# Patient Record
Sex: MALE | Race: OTHER | NOT HISPANIC OR LATINO | URBAN - METROPOLITAN AREA
[De-identification: names, ages, dates, MRNs, and addresses within clinical notes are randomized per-mention and may not be internally consistent; named-entity substitution may affect disease eponyms.]

---

## 2019-09-19 VITALS — HEIGHT: 66 IN | WEIGHT: 230.38 LBS

## 2019-09-19 NOTE — H&P ADULT - HISTORY OF PRESENT ILLNESS
History obtained mostly from paperwork, poor historian.        59 y/o male, poor historian, PMHx  abnormal EKG w/RBBB, prolonged QTc, VSD s/p VSD surgery @ [facility] 2001, recently diagnosed Anemia/Rectal bleed (H/H 10.2/33 on recent labs), PMHx HTN, DM,  Syncope/dizziness, known CAD, s/p Cardiac Cath @ [facility] 2016: PCI/Stents x 3 presented to his cardiologist, Dr. Webster's office with c/o worsening, moderate to severe SOB upon ambulation of less than 1/2 city block, unable to climb a flight of stairs, has to stop for 4-5mins before getting relief of symptoms.  Pt also reports intermittent, 7-8/10, non-radiating, dull, pressure-like, exertional left sided CP, a/w sweating, lasting for several minutes that resolved often on its own.  Pt reports most recent episode of CP 2 days ago, lasting approximately 10-15 minutes, took home meds with relief in symptoms.  He denies palpitations, diaphoresis, orthopnea, PND, lightheadedness, N/V, LE swelling, history of CHF or CVA. No diagnostic testing done recently in lieu of cardiac catheterization.     In light of the PMHx, risk factors, current CCS Class III Anginal symptoms, pt is recommended for cardiac catheterization with possible intervention to evaluate for further CAD.

## 2019-09-19 NOTE — H&P ADULT - NSHPREVIEWOFSYSTEMS_GEN_ALL_CORE
57 y/o female with PMHx HTN, DM, Chronic Anemia, Rectal bleed, Abn EKG with RBBB, known CAD, s/p Cardiac Cath @ [facility] 2016:  PCI/Stents x 3. 57 y/o male. with history of abnormal EKG with RBBB, recently diagnosed Anemia/Rectal bleed (H/H 10.2/33 on recent labs), PMHx HTN, DM, known CAD, s/p Cardiac Cath @ [facility] 2016:  PCI/Stents x 3 presented to his cardiologist, Dr. Webster's office with c/o worsening SOB upon ambulation of less than 1/2 city block, unable to climb a flight of stairs, stopping for 4-5mins, with symptoms relieved with rest. Pt also reports intermittent, 7-8/10, non-radiating, exertional left sided CP, lasting for several minutes that resolved often on its own. History obtained mostly from paperwork, poor historian.        57 y/o male, poor historian, PMHx  abnormal EKG w/RBBB, prolonged QTc, Tetralogy of Fallot, VSD s/p VSD surgery @ [facility] 2001, recently diagnosed Anemia/Rectal bleed (H/H 10.2/33 on recent labs), PMHx HTN, DM,  Syncope/dizziness, known CAD, s/p Cardiac Cath @ [facility] 2016: PCI/Stents x 3 presented to his cardiologist, Dr. Webster's office with c/o worsening, moderate to severe SOB upon ambulation of less than 1/2 city block, unable to climb a flight of stairs, has to stop for 4-5mins before getting relief of symptoms.  Pt also reports intermittent, 7-8/10, non-radiating, dull, pressure-like, exertional left sided CP, a/w sweating, lasting for several minutes that resolved often on its own.  Pt reports most recent episode of CP was 2 days ago, lasting approximately 10-15 minutes, took home meds with relief in symptoms.  He denies palpitations, diaphoresis, orthopnea, PND, syncope, dizziness, lightheadedness, N/V, LE swelling, history of CHF or CVA. No diagnostic testing done recently in lieu of cardiac catheterization.     In light of the PMHx, risk factors, current CCS Class III Anginal symptoms, pt is recommended for cardiac catheterization with possible intervention to evaluate for further CAD. History obtained mostly from paperwork, poor historian.        59 y/o male, poor historian, PMHx  abnormal EKG w/RBBB, prolonged QTc, Tetralogy of Fallot, VSD s/p VSD surgery @ [facility] 2001, recently diagnosed Anemia/Rectal bleed (H/H 10.2/33 on recent labs), PMHx HTN, DM,  Syncope/dizziness, known CAD, s/p Cardiac Cath @ [facility] 2016: PCI/Stents x 3 presented to his cardiologist, Dr. Webster's office with c/o worsening, moderate to severe SOB upon ambulation of less than 1/2 city block, unable to climb a flight of stairs, has to stop for 4-5mins before getting relief of symptoms.  Pt also reports intermittent, 7-8/10, non-radiating, dull, pressure-like, exertional left sided CP, a/w sweating, lasting for several minutes that resolved often on its own.  Pt reports most recent episode of CP 2 days ago, lasting approximately 10-15 minutes, took home meds with relief in symptoms.  He denies palpitations, diaphoresis, orthopnea, PND, lightheadedness, N/V, LE swelling, history of CHF or CVA. No diagnostic testing done recently in lieu of cardiac catheterization.     In light of the PMHx, risk factors, current CCS Class III Anginal symptoms, pt is recommended for cardiac catheterization with possible intervention to evaluate for further CAD.

## 2019-09-19 NOTE — H&P ADULT - NSICDXPASTMEDICALHX_GEN_ALL_CORE_FT
PAST MEDICAL HISTORY:  Anemia     CAD, multiple vessel     DM (diabetes mellitus)     HTN (hypertension)     Tetralogy of Fallot     VSD (ventricular septal defect) PAST MEDICAL HISTORY:  Anemia     CAD, multiple vessel     DM (diabetes mellitus)     HTN (hypertension)     VSD (ventricular septal defect)

## 2019-09-19 NOTE — H&P ADULT - ASSESSMENT
57 y/o male, poor historian, PMHx  abnormal EKG w/RBBB, prolonged QTc, Tetralogy of Fallot, VSD s/p VSD surgery @ [facility] 2001, recently diagnosed Anemia/Rectal bleed (H/H 10.2/33 on recent labs), PMHx HTN, DM,  Syncope/dizziness, known CAD, s/p Cardiac Cath @ [facility] 2016: PCI/Stents x 3 presented to [facility] for recommended cardiac cath with possible intervention in light of pt's risk factors, CCS 3 anginal symptoms and prior history of CAD.       ASA III and Mallampati III    OF NOTE:     Risks & benefits of procedure and alternative therapy have been explained to the patient including but not limited to: allergic reaction, bleeding w/possible need for blood transfusion, infection, renal and vascular compromise, limb damage, arrhythmia, stroke, vessel dissection/perforation, Myocardial infarction, emergent CABG. Informed consent obtained and in chart. 59 y/o male, poor historian, PMHx  abnormal EKG w/RBBB, prolonged QTc, Tetralogy of Fallot, VSD s/p VSD surgery @ [facility] 2001, recently diagnosed Anemia/Rectal bleed (H/H 10.2/33 on recent labs), PMHx HTN, DM,  Syncope/dizziness, known CAD, s/p Cardiac Cath @ [facility] 2016: PCI/Stents x 3 presented to [facility] for recommended cardiac cath with possible intervention in light of pt's risk factors, CCS 3 anginal symptoms and prior history of CAD.       ASA III and Mallampati III    OF NOTE:  Pt's Hgb is 9.6 (10.2 at baseline, no recent bleeding noted), Dr. Mckenzie was notified and will proceed with cath, pt will be loaded with his maintenance dose of ASA 81 mg PO x1 and Plavix 75 mg PO x 1. No Guaiac necessary as per Dr. Mckenzie.      Risks & benefits of procedure and alternative therapy have been explained to the patient including but not limited to: allergic reaction, bleeding w/possible need for blood transfusion, infection, renal and vascular compromise, limb damage, arrhythmia, stroke, vessel dissection/perforation, Myocardial infarction, emergent CABG. Informed consent obtained and in chart.

## 2019-09-20 ENCOUNTER — OUTPATIENT (OUTPATIENT)
Dept: OUTPATIENT SERVICES | Facility: HOSPITAL | Age: 59
LOS: 1 days | Discharge: NURSING, REHAB OR LONG-TERM CARE | End: 2019-09-20
Payer: COMMERCIAL

## 2019-09-20 DIAGNOSIS — Z87.74 PERSONAL HISTORY OF (CORRECTED) CONGENITAL MALFORMATIONS OF HEART AND CIRCULATORY SYSTEM: Chronic | ICD-10-CM

## 2019-09-20 LAB
ALBUMIN SERPL ELPH-MCNC: 4 G/DL — SIGNIFICANT CHANGE UP (ref 3.3–5)
ALP SERPL-CCNC: 66 U/L — SIGNIFICANT CHANGE UP (ref 40–120)
ALT FLD-CCNC: 12 U/L — SIGNIFICANT CHANGE UP (ref 10–45)
ANION GAP SERPL CALC-SCNC: 10 MMOL/L — SIGNIFICANT CHANGE UP (ref 5–17)
APTT BLD: 27.3 SEC — LOW (ref 27.5–36.3)
AST SERPL-CCNC: 16 U/L — SIGNIFICANT CHANGE UP (ref 10–40)
BASOPHILS # BLD AUTO: 0.05 K/UL — SIGNIFICANT CHANGE UP (ref 0–0.2)
BASOPHILS NFR BLD AUTO: 0.7 % — SIGNIFICANT CHANGE UP (ref 0–2)
BILIRUB SERPL-MCNC: 0.3 MG/DL — SIGNIFICANT CHANGE UP (ref 0.2–1.2)
BUN SERPL-MCNC: 17 MG/DL — SIGNIFICANT CHANGE UP (ref 7–23)
CALCIUM SERPL-MCNC: 8.8 MG/DL — SIGNIFICANT CHANGE UP (ref 8.4–10.5)
CHLORIDE SERPL-SCNC: 102 MMOL/L — SIGNIFICANT CHANGE UP (ref 96–108)
CHOLEST SERPL-MCNC: 79 MG/DL — SIGNIFICANT CHANGE UP (ref 10–199)
CK MB CFR SERPL CALC: 1.9 NG/ML — SIGNIFICANT CHANGE UP (ref 0–6.7)
CK SERPL-CCNC: 131 U/L — SIGNIFICANT CHANGE UP (ref 30–200)
CO2 SERPL-SCNC: 24 MMOL/L — SIGNIFICANT CHANGE UP (ref 22–31)
CREAT SERPL-MCNC: 1.13 MG/DL — SIGNIFICANT CHANGE UP (ref 0.5–1.3)
EOSINOPHIL # BLD AUTO: 0.1 K/UL — SIGNIFICANT CHANGE UP (ref 0–0.5)
EOSINOPHIL NFR BLD AUTO: 1.4 % — SIGNIFICANT CHANGE UP (ref 0–6)
GLUCOSE BLDC GLUCOMTR-MCNC: 120 MG/DL — HIGH (ref 70–99)
GLUCOSE BLDC GLUCOMTR-MCNC: 165 MG/DL — HIGH (ref 70–99)
GLUCOSE SERPL-MCNC: 175 MG/DL — HIGH (ref 70–99)
HBA1C BLD-MCNC: 7.9 % — HIGH (ref 4–5.6)
HCT VFR BLD CALC: 33.3 % — LOW (ref 39–50)
HDLC SERPL-MCNC: 20 MG/DL — LOW
HGB BLD-MCNC: 9.6 G/DL — LOW (ref 13–17)
IMM GRANULOCYTES NFR BLD AUTO: 0.4 % — SIGNIFICANT CHANGE UP (ref 0–1.5)
INR BLD: 1.16 — SIGNIFICANT CHANGE UP (ref 0.88–1.16)
LIPID PNL WITH DIRECT LDL SERPL: 34 MG/DL — SIGNIFICANT CHANGE UP
LYMPHOCYTES # BLD AUTO: 1.87 K/UL — SIGNIFICANT CHANGE UP (ref 1–3.3)
LYMPHOCYTES # BLD AUTO: 27 % — SIGNIFICANT CHANGE UP (ref 13–44)
MCHC RBC-ENTMCNC: 21.5 PG — LOW (ref 27–34)
MCHC RBC-ENTMCNC: 28.8 GM/DL — LOW (ref 32–36)
MCV RBC AUTO: 74.7 FL — LOW (ref 80–100)
MONOCYTES # BLD AUTO: 0.69 K/UL — SIGNIFICANT CHANGE UP (ref 0–0.9)
MONOCYTES NFR BLD AUTO: 10 % — SIGNIFICANT CHANGE UP (ref 2–14)
NEUTROPHILS # BLD AUTO: 4.19 K/UL — SIGNIFICANT CHANGE UP (ref 1.8–7.4)
NEUTROPHILS NFR BLD AUTO: 60.5 % — SIGNIFICANT CHANGE UP (ref 43–77)
NRBC # BLD: 0 /100 WBCS — SIGNIFICANT CHANGE UP (ref 0–0)
PLATELET # BLD AUTO: 310 K/UL — SIGNIFICANT CHANGE UP (ref 150–400)
POTASSIUM SERPL-MCNC: 4.4 MMOL/L — SIGNIFICANT CHANGE UP (ref 3.5–5.3)
POTASSIUM SERPL-SCNC: 4.4 MMOL/L — SIGNIFICANT CHANGE UP (ref 3.5–5.3)
PROT SERPL-MCNC: 7.9 G/DL — SIGNIFICANT CHANGE UP (ref 6–8.3)
PROTHROM AB SERPL-ACNC: 13.2 SEC — HIGH (ref 10–12.9)
RBC # BLD: 4.46 M/UL — SIGNIFICANT CHANGE UP (ref 4.2–5.8)
RBC # FLD: 16.4 % — HIGH (ref 10.3–14.5)
SODIUM SERPL-SCNC: 136 MMOL/L — SIGNIFICANT CHANGE UP (ref 135–145)
TOTAL CHOLESTEROL/HDL RATIO MEASUREMENT: 4 RATIO — SIGNIFICANT CHANGE UP (ref 3.4–9.6)
TRIGL SERPL-MCNC: 125 MG/DL — SIGNIFICANT CHANGE UP (ref 10–149)
WBC # BLD: 6.93 K/UL — SIGNIFICANT CHANGE UP (ref 3.8–10.5)
WBC # FLD AUTO: 6.93 K/UL — SIGNIFICANT CHANGE UP (ref 3.8–10.5)

## 2019-09-20 PROCEDURE — 93458 L HRT ARTERY/VENTRICLE ANGIO: CPT | Mod: 26

## 2019-09-20 PROCEDURE — 80053 COMPREHEN METABOLIC PANEL: CPT

## 2019-09-20 PROCEDURE — 82550 ASSAY OF CK (CPK): CPT

## 2019-09-20 PROCEDURE — C1887: CPT

## 2019-09-20 PROCEDURE — 93458 L HRT ARTERY/VENTRICLE ANGIO: CPT

## 2019-09-20 PROCEDURE — 83036 HEMOGLOBIN GLYCOSYLATED A1C: CPT

## 2019-09-20 PROCEDURE — 93005 ELECTROCARDIOGRAM TRACING: CPT

## 2019-09-20 PROCEDURE — 82553 CREATINE MB FRACTION: CPT

## 2019-09-20 PROCEDURE — 85610 PROTHROMBIN TIME: CPT

## 2019-09-20 PROCEDURE — 93010 ELECTROCARDIOGRAM REPORT: CPT

## 2019-09-20 PROCEDURE — C1894: CPT

## 2019-09-20 PROCEDURE — 82962 GLUCOSE BLOOD TEST: CPT

## 2019-09-20 PROCEDURE — 85025 COMPLETE CBC W/AUTO DIFF WBC: CPT

## 2019-09-20 PROCEDURE — 80061 LIPID PANEL: CPT

## 2019-09-20 PROCEDURE — 85730 THROMBOPLASTIN TIME PARTIAL: CPT

## 2019-09-20 PROCEDURE — C1760: CPT

## 2019-09-20 PROCEDURE — C1769: CPT

## 2019-09-20 RX ORDER — NITROGLYCERIN 6.5 MG
1 CAPSULE, EXTENDED RELEASE ORAL
Qty: 0 | Refills: 0 | DISCHARGE

## 2019-09-20 RX ORDER — ASPIRIN/CALCIUM CARB/MAGNESIUM 324 MG
81 TABLET ORAL ONCE
Refills: 0 | Status: COMPLETED | OUTPATIENT
Start: 2019-09-20 | End: 2019-09-20

## 2019-09-20 RX ORDER — GLUCAGON INJECTION, SOLUTION 0.5 MG/.1ML
1 INJECTION, SOLUTION SUBCUTANEOUS ONCE
Refills: 0 | Status: DISCONTINUED | OUTPATIENT
Start: 2019-09-20 | End: 2019-09-20

## 2019-09-20 RX ORDER — SODIUM CHLORIDE 9 MG/ML
500 INJECTION INTRAMUSCULAR; INTRAVENOUS; SUBCUTANEOUS
Refills: 0 | Status: DISCONTINUED | OUTPATIENT
Start: 2019-09-20 | End: 2019-09-20

## 2019-09-20 RX ORDER — SODIUM CHLORIDE 9 MG/ML
1000 INJECTION, SOLUTION INTRAVENOUS
Refills: 0 | Status: DISCONTINUED | OUTPATIENT
Start: 2019-09-20 | End: 2019-09-20

## 2019-09-20 RX ORDER — CLOPIDOGREL BISULFATE 75 MG/1
75 TABLET, FILM COATED ORAL ONCE
Refills: 0 | Status: COMPLETED | OUTPATIENT
Start: 2019-09-20 | End: 2019-09-20

## 2019-09-20 RX ORDER — DEXTROSE 50 % IN WATER 50 %
12.5 SYRINGE (ML) INTRAVENOUS ONCE
Refills: 0 | Status: DISCONTINUED | OUTPATIENT
Start: 2019-09-20 | End: 2019-09-20

## 2019-09-20 RX ORDER — DEXTROSE 50 % IN WATER 50 %
25 SYRINGE (ML) INTRAVENOUS ONCE
Refills: 0 | Status: DISCONTINUED | OUTPATIENT
Start: 2019-09-20 | End: 2019-09-20

## 2019-09-20 RX ORDER — AMLODIPINE BESYLATE 2.5 MG/1
1 TABLET ORAL
Qty: 0 | Refills: 0 | DISCHARGE

## 2019-09-20 RX ORDER — INSULIN LISPRO 100/ML
VIAL (ML) SUBCUTANEOUS ONCE
Refills: 0 | Status: DISCONTINUED | OUTPATIENT
Start: 2019-09-20 | End: 2019-09-20

## 2019-09-20 RX ORDER — DEXTROSE 50 % IN WATER 50 %
15 SYRINGE (ML) INTRAVENOUS ONCE
Refills: 0 | Status: DISCONTINUED | OUTPATIENT
Start: 2019-09-20 | End: 2019-09-20

## 2019-09-20 RX ORDER — CHLORHEXIDINE GLUCONATE 213 G/1000ML
1 SOLUTION TOPICAL ONCE
Refills: 0 | Status: DISCONTINUED | OUTPATIENT
Start: 2019-09-20 | End: 2019-09-20

## 2019-09-20 RX ADMIN — SODIUM CHLORIDE 50 MILLILITER(S): 9 INJECTION INTRAMUSCULAR; INTRAVENOUS; SUBCUTANEOUS at 10:34

## 2019-09-20 RX ADMIN — CLOPIDOGREL BISULFATE 75 MILLIGRAM(S): 75 TABLET, FILM COATED ORAL at 10:31

## 2019-09-20 RX ADMIN — Medication 81 MILLIGRAM(S): at 10:32

## 2019-09-20 NOTE — PROGRESS NOTE ADULT - SUBJECTIVE AND OBJECTIVE BOX
Interventional Cardiology [facility] Discharge Note    Patient without complaints. Ambulated and voided without difficulties    Afebrile, VSS    Ext: Right Groin: no hematoma, no bruit, dressing; C/D/I    Pulses: intact RAD/DP/PT to baseline     A/P:  57 y/o male, poor historian, PMHx  abnormal EKG w/RBBB, prolonged QTc, VSD s/p VSD surgery @ [facility] 2001, recently diagnosed Anemia/Rectal bleed (H/H 10.2/33 on recent labs), PMHx HTN, DM,  Syncope/dizziness, known CAD, s/p Cardiac Cath @ [facility] 2016: PCI/Stents x 3 presented to his cardiologist, Dr. Webster's office with c/o worsening, moderate to severe SOB upon ambulation of less than 1/2 city block, unable to climb a flight of stairs, has to stop for 4-5mins before getting relief of symptoms.  Pt also reports intermittent, 7-8/10, non-radiating, dull, pressure-like, exertional left sided CP, a/w sweating, lasting for several minutes that resolved often on its own.  Pt reports most recent episode of CP 2 days ago, lasting approximately 10-15 minutes, took home meds with relief in symptoms.  He denies palpitations, diaphoresis, orthopnea, PND, lightheadedness, N/V, LE swelling, history of CHF or CVA. No diagnostic testing done recently in lieu of cardiac catheterization. In light of the PMHx, risk factors, current CCS Class III Anginal symptoms, pt is recommended for cardiac catheterization with possible intervention to evaluate for further CAD.  Pt s/p cardiac catheterization revealing LM 80%, OM 1 50%, EF 55%, EDP 9. Plan for outpt discussion and consultation for CABG vs PCI of LM, as discussed with Dr. Pulliam.      1.	Stable for discharge as per attending Dr. Pulliam after bed rest, pt voids, groin stable and 30 minutes of ambulation.  2.	Follow-up with PMD/Cardiologist Eleanor in 1-2 weeks  3.	Discharged forms signed and copies in chart

## 2019-09-20 NOTE — PROGRESS NOTE ADULT - SUBJECTIVE AND OBJECTIVE BOX
Procedure: LHC, Angioseal  Indication: UA, CAD  Complication: none    Result:  1) Left Main & Single Vessel CAD (80% LM, 50% OM1)  2) Normal LV function, EF 55%, LVEDP 9    Plan: Outpatient discussion & consultation for CABG vs PCI LM. To f/u with Dr. Webster.

## 2019-10-14 VITALS
HEART RATE: 73 BPM | OXYGEN SATURATION: 100 % | HEIGHT: 66 IN | SYSTOLIC BLOOD PRESSURE: 134 MMHG | DIASTOLIC BLOOD PRESSURE: 85 MMHG | WEIGHT: 228.4 LBS | RESPIRATION RATE: 16 BRPM | TEMPERATURE: 98 F

## 2019-10-14 PROBLEM — D64.9 ANEMIA, UNSPECIFIED: Chronic | Status: ACTIVE | Noted: 2019-09-20

## 2019-10-14 PROBLEM — I25.10 ATHEROSCLEROTIC HEART DISEASE OF NATIVE CORONARY ARTERY WITHOUT ANGINA PECTORIS: Chronic | Status: ACTIVE | Noted: 2019-09-20

## 2019-10-14 PROBLEM — I10 ESSENTIAL (PRIMARY) HYPERTENSION: Chronic | Status: ACTIVE | Noted: 2019-09-20

## 2019-10-14 PROBLEM — E11.9 TYPE 2 DIABETES MELLITUS WITHOUT COMPLICATIONS: Chronic | Status: ACTIVE | Noted: 2019-09-20

## 2019-10-14 PROBLEM — Q21.0 VENTRICULAR SEPTAL DEFECT: Chronic | Status: ACTIVE | Noted: 2019-09-20

## 2019-10-14 NOTE — H&P ADULT - ASSESSMENT
59 y/o male, poor historian, PMHx of HTN, abnormal EKG w/RBBB, prolonged QTc, VSD s/p VSD surgery @ [facility] 2001,  CAD s/p cardiac catheterization @ [facility] on 9/20/19 revealing LMCA 80%, OM 1 50% (discharged with plan for outpt discussion and consultation for CABG vs PCI of LM with subsequent decision with Dr. Webster for staged PCI of LCMA), acute blood loss anemia 2/2 rectal bleed requiring blood transfusion s/p EGD/colonoscopy ~10/1/19 that was unremarkable (H/H 9.6/33.3 on recent labs 9/20/19) who presented today for staged PCI of LM.    ASA III and Mallampati III    OF NOTE: pt was loaded with  mg PO x1 and was given maintenance dose of Plavix 75 mg PO x1 prior to procedure. Pt refused the , speaks English and Turkish, pt understands today's procedure and repeated back my explanation of the procedure/risks/benefits.    Risks & benefits of procedure and alternative therapy have been explained to the patient including but not limited to: allergic reaction, bleeding w/possible need for blood transfusion, infection, renal and vascular compromise, limb damage, arrhythmia, stroke, vessel dissection/perforation, Myocardial infarction, emergent CABG. Informed consent obtained and in chart.

## 2019-10-14 NOTE — H&P ADULT - NSICDXPASTMEDICALHX_GEN_ALL_CORE_FT
PAST MEDICAL HISTORY:  Anemia     CAD, multiple vessel     DM (diabetes mellitus)     HTN (hypertension)     VSD (ventricular septal defect)

## 2019-10-14 NOTE — H&P ADULT - HISTORY OF PRESENT ILLNESS
**Verify meds    59 y/o male, poor historian, PMHx of abnormal EKG w/RBBB, prolonged QTc, HTN, VSD s/p VSD surgery @ [facility] 2001,  CAD s/p cardiac catheterization @ [facility] on 9/20/19 revealing LM 80%, OM 1 50% with plan for outpt discussion and consultation for CABG vs PCI of LM, as discussed with Dr. Pulliam.    recently diagnosed anemia/rectal bleed (H/H 9.6/33.3 on recent labs 9/20/19),     who initially presented to Cardiologist, Dr. Webster, with c/o worsening, moderate to severe SOB upon ambulation of less than 1/2 city block, unable to climb a flight of stairs, has to stop for 4-5mins before getting relief of symptoms.      Pt also reports intermittent, 7-8/10, non-radiating, dull, pressure-like, exertional left sided CP, a/w sweating, lasting for several minutes that resolved often on its own.  Pt reports most recent episode of CP 2 days ago, lasting approximately 10-15 minutes, took home meds with relief in symptoms.     He denies palpitations, diaphoresis, orthopnea, PND, lightheadedness, N/V, LE swelling, history of CHF or CVA.     In light of the PMHx, risk factors, current CCS Class III Anginal symptoms, pt is recommended for cardiac catheterization with possible intervention to evaluate for further CAD. **Verify meds    59 y/o male, poor historian, PMHx of HTN, abnormal EKG w/RBBB, prolonged QTc, VSD s/p VSD surgery @ [facility] 2001,  CAD s/p cardiac catheterization @ [facility] on 9/20/19 revealing LMCA 80%, OM 1 50% (discharged with plan for outpt discussion and consultation for CABG vs PCI of LM with subsequent decision with Dr. Webster for staged PCI of LCMA), acute blood loss anemia 2/2 rectal bleed requiring blood transfusion s/p EGD/colonoscopy ~10/1/19 that was unremarkable (H/H 9.6/33.3 on recent labs 9/20/19) who continues to c/o non-radiating, LSCP described as dull/pressure-like and of 8/10 intensity with associated moderate/severe SOB/fatigue/diaphoresis upon ambulation of less than 1/2 city block & <1 flight of stairs, resolving with 4-5 minutes of rest, over past 6  months. Furthermore, pt endorses intermittent episodes of PND/SOB that wakes him up from sleep occurring once every other night over past 6 months. Ifeanyi palpitations, orthopnea, lightheadedness, LE edema, syncope, N/V, abdominal pain. In light pt's risk factors, current CCS Class III/VI Anginal symptoms, known physiologically significant lesion, pt is referred for staged PCI of LMCA 80%. 59 y/o male, poor historian, PMHx of HTN, abnormal EKG w/RBBB, prolonged QTc, VSD s/p VSD surgery @ [facility] 2001,  CAD s/p cardiac catheterization @ [facility] on 9/20/19 revealing LMCA 80%, OM 1 50% (discharged with plan for outpt discussion and consultation for CABG vs PCI of LM with subsequent decision with Dr. Webster for staged PCI of LCMA), acute blood loss anemia 2/2 rectal bleed requiring blood transfusion s/p EGD/colonoscopy ~10/1/19 that was unremarkable (H/H 9.6/33.3 on recent labs 9/20/19) who continues to c/o non-radiating, LSCP described as dull/pressure-like and of 8/10 intensity with associated moderate/severe SOB/fatigue/diaphoresis upon ambulation of less than 1/2 city block & <1 flight of stairs, resolving with 4-5 minutes of rest, over past 6  months. Furthermore, pt endorses intermittent episodes of PND/SOB that wakes him up from sleep occurring once every other night over past 6 months. Ifeanyi palpitations, orthopnea, lightheadedness, LE edema, syncope, N/V, abdominal pain. In light pt's risk factors, current CCS Class III/VI Anginal symptoms, known physiologically significant lesion, pt is referred for staged PCI of LMCA 80%.     OF NOTE: By last cath pt's EF is 55 and EDP is 9

## 2019-10-15 ENCOUNTER — INPATIENT (INPATIENT)
Facility: HOSPITAL | Age: 59
LOS: 0 days | Discharge: HOME | DRG: 247 | End: 2019-10-16
Attending: INTERNAL MEDICINE | Admitting: INTERNAL MEDICINE
Payer: COMMERCIAL

## 2019-10-15 DIAGNOSIS — Z87.74 PERSONAL HISTORY OF (CORRECTED) CONGENITAL MALFORMATIONS OF HEART AND CIRCULATORY SYSTEM: Chronic | ICD-10-CM

## 2019-10-15 LAB
ALBUMIN SERPL ELPH-MCNC: 4.2 G/DL — SIGNIFICANT CHANGE UP (ref 3.3–5)
ALP SERPL-CCNC: 71 U/L — SIGNIFICANT CHANGE UP (ref 40–120)
ALT FLD-CCNC: 15 U/L — SIGNIFICANT CHANGE UP (ref 10–45)
ANION GAP SERPL CALC-SCNC: 8 MMOL/L — SIGNIFICANT CHANGE UP (ref 5–17)
APTT BLD: 30.8 SEC — SIGNIFICANT CHANGE UP (ref 27.5–36.3)
AST SERPL-CCNC: 13 U/L — SIGNIFICANT CHANGE UP (ref 10–40)
BASOPHILS # BLD AUTO: 0.04 K/UL — SIGNIFICANT CHANGE UP (ref 0–0.2)
BASOPHILS NFR BLD AUTO: 0.6 % — SIGNIFICANT CHANGE UP (ref 0–2)
BILIRUB SERPL-MCNC: 0.4 MG/DL — SIGNIFICANT CHANGE UP (ref 0.2–1.2)
BUN SERPL-MCNC: 15 MG/DL — SIGNIFICANT CHANGE UP (ref 7–23)
CALCIUM SERPL-MCNC: 9.2 MG/DL — SIGNIFICANT CHANGE UP (ref 8.4–10.5)
CHLORIDE SERPL-SCNC: 106 MMOL/L — SIGNIFICANT CHANGE UP (ref 96–108)
CHOLEST SERPL-MCNC: 81 MG/DL — SIGNIFICANT CHANGE UP (ref 10–199)
CK MB CFR SERPL CALC: 1.9 NG/ML — SIGNIFICANT CHANGE UP (ref 0–6.7)
CK SERPL-CCNC: 102 U/L — SIGNIFICANT CHANGE UP (ref 30–200)
CO2 SERPL-SCNC: 24 MMOL/L — SIGNIFICANT CHANGE UP (ref 22–31)
CREAT SERPL-MCNC: 1.09 MG/DL — SIGNIFICANT CHANGE UP (ref 0.5–1.3)
CRP SERPL-MCNC: 0.75 MG/DL — HIGH (ref 0–0.4)
EOSINOPHIL # BLD AUTO: 0.11 K/UL — SIGNIFICANT CHANGE UP (ref 0–0.5)
EOSINOPHIL NFR BLD AUTO: 1.7 % — SIGNIFICANT CHANGE UP (ref 0–6)
GLUCOSE SERPL-MCNC: 238 MG/DL — HIGH (ref 70–99)
HBA1C BLD-MCNC: 7.8 % — HIGH (ref 4–5.6)
HCT VFR BLD CALC: 36.9 % — LOW (ref 39–50)
HDLC SERPL-MCNC: 19 MG/DL — LOW
HGB BLD-MCNC: 10.5 G/DL — LOW (ref 13–17)
IMM GRANULOCYTES NFR BLD AUTO: 0.3 % — SIGNIFICANT CHANGE UP (ref 0–1.5)
INR BLD: 1.1 — SIGNIFICANT CHANGE UP (ref 0.88–1.16)
LIPID PNL WITH DIRECT LDL SERPL: 37 MG/DL — SIGNIFICANT CHANGE UP
LYMPHOCYTES # BLD AUTO: 1.57 K/UL — SIGNIFICANT CHANGE UP (ref 1–3.3)
LYMPHOCYTES # BLD AUTO: 24.4 % — SIGNIFICANT CHANGE UP (ref 13–44)
MCHC RBC-ENTMCNC: 21.6 PG — LOW (ref 27–34)
MCHC RBC-ENTMCNC: 28.5 GM/DL — LOW (ref 32–36)
MCV RBC AUTO: 75.8 FL — LOW (ref 80–100)
MONOCYTES # BLD AUTO: 0.66 K/UL — SIGNIFICANT CHANGE UP (ref 0–0.9)
MONOCYTES NFR BLD AUTO: 10.2 % — SIGNIFICANT CHANGE UP (ref 2–14)
NEUTROPHILS # BLD AUTO: 4.04 K/UL — SIGNIFICANT CHANGE UP (ref 1.8–7.4)
NEUTROPHILS NFR BLD AUTO: 62.8 % — SIGNIFICANT CHANGE UP (ref 43–77)
NRBC # BLD: 0 /100 WBCS — SIGNIFICANT CHANGE UP (ref 0–0)
PLATELET # BLD AUTO: 340 K/UL — SIGNIFICANT CHANGE UP (ref 150–400)
POTASSIUM SERPL-MCNC: 4.3 MMOL/L — SIGNIFICANT CHANGE UP (ref 3.5–5.3)
POTASSIUM SERPL-SCNC: 4.3 MMOL/L — SIGNIFICANT CHANGE UP (ref 3.5–5.3)
PROT SERPL-MCNC: 8.2 G/DL — SIGNIFICANT CHANGE UP (ref 6–8.3)
PROTHROM AB SERPL-ACNC: 12.5 SEC — SIGNIFICANT CHANGE UP (ref 10–12.9)
RBC # BLD: 4.87 M/UL — SIGNIFICANT CHANGE UP (ref 4.2–5.8)
RBC # FLD: 16.2 % — HIGH (ref 10.3–14.5)
SODIUM SERPL-SCNC: 138 MMOL/L — SIGNIFICANT CHANGE UP (ref 135–145)
TOTAL CHOLESTEROL/HDL RATIO MEASUREMENT: 4.3 RATIO — SIGNIFICANT CHANGE UP (ref 3.4–9.6)
TRIGL SERPL-MCNC: 126 MG/DL — SIGNIFICANT CHANGE UP (ref 10–149)
WBC # BLD: 6.44 K/UL — SIGNIFICANT CHANGE UP (ref 3.8–10.5)
WBC # FLD AUTO: 6.44 K/UL — SIGNIFICANT CHANGE UP (ref 3.8–10.5)

## 2019-10-15 PROCEDURE — 92978 ENDOLUMINL IVUS OCT C 1ST: CPT | Mod: 26,LD,59

## 2019-10-15 PROCEDURE — 93010 ELECTROCARDIOGRAM REPORT: CPT

## 2019-10-15 PROCEDURE — 99233 SBSQ HOSP IP/OBS HIGH 50: CPT

## 2019-10-15 PROCEDURE — 93454 CORONARY ARTERY ANGIO S&I: CPT | Mod: 26,59

## 2019-10-15 PROCEDURE — 92928 PRQ TCAT PLMT NTRAC ST 1 LES: CPT | Mod: LM,59

## 2019-10-15 RX ORDER — AMLODIPINE BESYLATE 2.5 MG/1
5 TABLET ORAL ONCE
Refills: 0 | Status: COMPLETED | OUTPATIENT
Start: 2019-10-15 | End: 2019-10-15

## 2019-10-15 RX ORDER — BISOPROLOL FUMARATE 10 MG/1
10 TABLET, FILM COATED ORAL DAILY
Refills: 0 | Status: DISCONTINUED | OUTPATIENT
Start: 2019-10-16 | End: 2019-10-16

## 2019-10-15 RX ORDER — DEXTROSE 50 % IN WATER 50 %
12.5 SYRINGE (ML) INTRAVENOUS ONCE
Refills: 0 | Status: DISCONTINUED | OUTPATIENT
Start: 2019-10-15 | End: 2019-10-16

## 2019-10-15 RX ORDER — DEXTROSE 50 % IN WATER 50 %
25 SYRINGE (ML) INTRAVENOUS ONCE
Refills: 0 | Status: DISCONTINUED | OUTPATIENT
Start: 2019-10-15 | End: 2019-10-15

## 2019-10-15 RX ORDER — CLOPIDOGREL BISULFATE 75 MG/1
75 TABLET, FILM COATED ORAL ONCE
Refills: 0 | Status: COMPLETED | OUTPATIENT
Start: 2019-10-15 | End: 2019-10-15

## 2019-10-15 RX ORDER — LOSARTAN POTASSIUM 100 MG/1
100 TABLET, FILM COATED ORAL DAILY
Refills: 0 | Status: DISCONTINUED | OUTPATIENT
Start: 2019-10-16 | End: 2019-10-16

## 2019-10-15 RX ORDER — SODIUM CHLORIDE 9 MG/ML
1000 INJECTION, SOLUTION INTRAVENOUS
Refills: 0 | Status: DISCONTINUED | OUTPATIENT
Start: 2019-10-15 | End: 2019-10-16

## 2019-10-15 RX ORDER — DEXTROSE 50 % IN WATER 50 %
15 SYRINGE (ML) INTRAVENOUS ONCE
Refills: 0 | Status: DISCONTINUED | OUTPATIENT
Start: 2019-10-15 | End: 2019-10-16

## 2019-10-15 RX ORDER — DEXTROSE 50 % IN WATER 50 %
15 SYRINGE (ML) INTRAVENOUS ONCE
Refills: 0 | Status: DISCONTINUED | OUTPATIENT
Start: 2019-10-15 | End: 2019-10-15

## 2019-10-15 RX ORDER — ATORVASTATIN CALCIUM 80 MG/1
20 TABLET, FILM COATED ORAL AT BEDTIME
Refills: 0 | Status: DISCONTINUED | OUTPATIENT
Start: 2019-10-15 | End: 2019-10-16

## 2019-10-15 RX ORDER — INSULIN LISPRO 100/ML
VIAL (ML) SUBCUTANEOUS
Refills: 0 | Status: DISCONTINUED | OUTPATIENT
Start: 2019-10-15 | End: 2019-10-16

## 2019-10-15 RX ORDER — PANTOPRAZOLE SODIUM 20 MG/1
40 TABLET, DELAYED RELEASE ORAL
Refills: 0 | Status: DISCONTINUED | OUTPATIENT
Start: 2019-10-15 | End: 2019-10-16

## 2019-10-15 RX ORDER — CHLORHEXIDINE GLUCONATE 213 G/1000ML
1 SOLUTION TOPICAL ONCE
Refills: 0 | Status: DISCONTINUED | OUTPATIENT
Start: 2019-10-15 | End: 2019-10-15

## 2019-10-15 RX ORDER — GLUCAGON INJECTION, SOLUTION 0.5 MG/.1ML
1 INJECTION, SOLUTION SUBCUTANEOUS ONCE
Refills: 0 | Status: DISCONTINUED | OUTPATIENT
Start: 2019-10-15 | End: 2019-10-16

## 2019-10-15 RX ORDER — ASPIRIN/CALCIUM CARB/MAGNESIUM 324 MG
325 TABLET ORAL ONCE
Refills: 0 | Status: COMPLETED | OUTPATIENT
Start: 2019-10-15 | End: 2019-10-15

## 2019-10-15 RX ORDER — INFLUENZA VIRUS VACCINE 15; 15; 15; 15 UG/.5ML; UG/.5ML; UG/.5ML; UG/.5ML
0.5 SUSPENSION INTRAMUSCULAR ONCE
Refills: 0 | Status: COMPLETED | OUTPATIENT
Start: 2019-10-15 | End: 2019-10-16

## 2019-10-15 RX ORDER — DEXTROSE 50 % IN WATER 50 %
25 SYRINGE (ML) INTRAVENOUS ONCE
Refills: 0 | Status: DISCONTINUED | OUTPATIENT
Start: 2019-10-15 | End: 2019-10-16

## 2019-10-15 RX ORDER — DEXTROSE 50 % IN WATER 50 %
12.5 SYRINGE (ML) INTRAVENOUS ONCE
Refills: 0 | Status: DISCONTINUED | OUTPATIENT
Start: 2019-10-15 | End: 2019-10-15

## 2019-10-15 RX ORDER — BISOPROLOL FUMARATE 10 MG/1
1 TABLET, FILM COATED ORAL
Qty: 0 | Refills: 0 | DISCHARGE

## 2019-10-15 RX ORDER — SODIUM CHLORIDE 9 MG/ML
500 INJECTION INTRAMUSCULAR; INTRAVENOUS; SUBCUTANEOUS
Refills: 0 | Status: DISCONTINUED | OUTPATIENT
Start: 2019-10-15 | End: 2019-10-16

## 2019-10-15 RX ORDER — SODIUM CHLORIDE 9 MG/ML
1000 INJECTION, SOLUTION INTRAVENOUS
Refills: 0 | Status: DISCONTINUED | OUTPATIENT
Start: 2019-10-15 | End: 2019-10-15

## 2019-10-15 RX ORDER — GLUCAGON INJECTION, SOLUTION 0.5 MG/.1ML
1 INJECTION, SOLUTION SUBCUTANEOUS ONCE
Refills: 0 | Status: DISCONTINUED | OUTPATIENT
Start: 2019-10-15 | End: 2019-10-15

## 2019-10-15 RX ORDER — INSULIN LISPRO 100/ML
VIAL (ML) SUBCUTANEOUS ONCE
Refills: 0 | Status: DISCONTINUED | OUTPATIENT
Start: 2019-10-15 | End: 2019-10-15

## 2019-10-15 RX ORDER — TICAGRELOR 90 MG/1
90 TABLET ORAL EVERY 12 HOURS
Refills: 0 | Status: DISCONTINUED | OUTPATIENT
Start: 2019-10-16 | End: 2019-10-16

## 2019-10-15 RX ORDER — ASPIRIN/CALCIUM CARB/MAGNESIUM 324 MG
81 TABLET ORAL DAILY
Refills: 0 | Status: DISCONTINUED | OUTPATIENT
Start: 2019-10-15 | End: 2019-10-16

## 2019-10-15 RX ORDER — SODIUM CHLORIDE 9 MG/ML
500 INJECTION INTRAMUSCULAR; INTRAVENOUS; SUBCUTANEOUS
Refills: 0 | Status: DISCONTINUED | OUTPATIENT
Start: 2019-10-15 | End: 2019-10-15

## 2019-10-15 RX ADMIN — ATORVASTATIN CALCIUM 20 MILLIGRAM(S): 80 TABLET, FILM COATED ORAL at 21:42

## 2019-10-15 RX ADMIN — CLOPIDOGREL BISULFATE 75 MILLIGRAM(S): 75 TABLET, FILM COATED ORAL at 12:23

## 2019-10-15 RX ADMIN — Medication 325 MILLIGRAM(S): at 12:24

## 2019-10-15 RX ADMIN — SODIUM CHLORIDE 75 MILLILITER(S): 9 INJECTION INTRAMUSCULAR; INTRAVENOUS; SUBCUTANEOUS at 12:17

## 2019-10-15 RX ADMIN — Medication 2: at 21:42

## 2019-10-15 RX ADMIN — AMLODIPINE BESYLATE 5 MILLIGRAM(S): 2.5 TABLET ORAL at 21:42

## 2019-10-15 NOTE — PROGRESS NOTE ADULT - SUBJECTIVE AND OBJECTIVE BOX
Procedure: Coronary angiography w/o LHC, IVUS LM, NITA LM, Angioseal  Indication: UA, CAD  Complication: none    Result:  1) Successful NITA of 80% LM with 4.5x15mm Resolute stent. Post-dilated with 5.0mm NC balloon.   2) IVUS: Pre-stent CSA=4.4mm2, post-stent CSA=13.3mm2    Plan: Admit given ACS presentation. Brilinta 180mg load followed by 90mg BID = ASA 81mg QD x 12 months. After 12 months can switch to Plavix + ASA indefinitely. To f/u with Dr. Webster.

## 2019-10-15 NOTE — CONSULT NOTE ADULT - SUBJECTIVE AND OBJECTIVE BOX
Preventive Cardiology Consultation Note    Cardiologist - Dr. Rahul Webster     CHIEF COMPLAINT: s/p cardiac catheterization requiring cardiovascular prevention optimization and education    HISTORY OF PRESENT ILLNESS: 58 y/o male, PMHx of HTN, abnormal EKG w/RBBB, prolonged QTc, VSD s/p VSD surgery @ [facility] 2001, CAD s/p cardiac catheterization @ [facility] on 9/20/19 revealing LMCA 80%, OM 1 50% (discharged with plan for outpt discussion and consultation for CABG vs PCI of LM with subsequent decision with Dr. Webster for staged PCI of LCMA), acute blood loss anemia 2/2 rectal bleed requiring blood transfusion s/p EGD/colonoscopy ~10/1/19 that was unremarkable. Patient is now s/p cardiac cath today 10/15/19: successful NITA of 80% LM with 4.5x15mm Resolute stent.    Review of systems otherwise negative.     Lifestyle History:  Mediterranean Diet Score (9 question survey) was 5.   (8-9: optimal, 6-7: near-optimal, 4-5: suboptimal, 0-3: markedly suboptimal)  Exercise: Patient denies any regular exercise other than walking necessary for daily activities.   Smoking: Patient denies any history of smoking.   Stress: Patient denies any stress.     PAST MEDICAL & SURGICAL HISTORY:  CAD, multiple vessel  DM (diabetes mellitus)  HTN (hypertension)  Anemia  VSD (ventricular septal defect)  S/P VSD repair: 2001    FAMILY HISTORY:   heart disease - both brothers.    Allergies:   No Known Allergies      HOME MEDICATIONS:   alogliptin-metformin 12.5 mg-1000 mg oral tablet: 1 tab(s) orally 2 times a day (15 Oct 2019 12:17)  aspirin 81 mg oral tablet: 1 tab(s) orally once a day (15 Oct 2019 12:17)  atorvastatin 20 mg oral tablet: 1 tab(s) orally once a day (15 Oct 2019 12:17)  bisoprolol 10 mg oral tablet: 1 tab(s) orally once a day (15 Oct 2019 12:17)  clopidogrel 75 mg oral tablet: 1 tab(s) orally once a day (15 Oct 2019 12:17)  Ferrex 150 Plus oral capsule: 1 cap(s) orally once a day (15 Oct 2019 12:17)  glipiZIDE 5 mg oral tablet: orally once a day (15 Oct 2019 12:17)  losartan 100 mg oral tablet: 1 tab(s) orally once a day (15 Oct 2019 12:17)  pantoprazole 40 mg oral delayed release tablet: 1 tab(s) orally once a day (15 Oct 2019 12:17)      PHYSICAL EXAM:    HR: 78 (10-15-19 @ 16:32) (75 - 78)  BP: 152/97 (10-15-19 @ 16:32) (152/97 - 180/107)  RR: 18 (10-15-19 @ 16:32) (16 - 18)  SpO2: 98% (10-15-19 @ 16:32) (98% - 98%)  Height (cm): 167.64 (10-15 @ 12:24)  Weight (kg): 103.6 (10-15 @ 12:24)  BMI (kg/m2): 36.9 (10-15 @ 12:24)  	  Gen- awake, conversive  Head-NCAT; eyes: no corneal arcus noted b/l; no xanthelasmas   Neck- no thyromegaly, no cervical LAD, no JVD, no carotid bruit b/l  Respiratory- good air entry b/l, no WRR  Cardiovascular- S1S2, RRR, no MRG appr, no LE edema b/l, distal pulses 2+ b/l  Gastrointestinal- no HSM, no masses  Neurology- moves all extremities, no focal deficits  Psych- normal affect, non-depressed mood  Skin- no lesions, no rashes, no xanthomas     LABS:	                          10.5   6.44  )-----------( 340      ( 15 Oct 2019 11:33 )             36.9     10-15    138  |  106  |  15  ----------------------------<  238<H>  4.3   |  24  |  1.09    Ca    9.2      15 Oct 2019 11:33    TPro  8.2  /  Alb  4.2  /  TBili  0.4  /  DBili  x   /  AST  13  /  ALT  15  /  AlkPhos  71  10-15      Hemoglobin A1C, Whole Blood: 7.8 % (10-15 @ 11:33)      Cholesterol, Serum: 81 mg/dL (10-15 @ 11:33)  HDL Cholesterol, Serum: 19 mg/dL (10-15 @ 11:33)  Triglycerides, Serum: 126 mg/dL (10-15 @ 11:33)  Direct LDL: 37 mg/dL (10-15 @ 11:33)    C-Reactive Protein, Serum: 0.75 mg/dL (10-15 @ 11:33)      ASSESSMENT/RECOMMENDATIONS: 	  Patient's dietary, exercise and overall lifestyle habits were reviewed. The concept of atherosclerosis and its systemic nature was discussed with a focus on the need to get all cardiovascular risk factors to goal. At this time, I would like to make the following recommendations to optimize atherosclerotic risk factors.     RECOMMENDATIONS:   Anti-platelet Therapy: DAPT per interventionalist recommendation.   Lipid Therapy: Patient is currently taking atorvastatin 20mg daily and is compliant and tolerating it well. We believe this is an appropriate medication at this time, as his current LDL-C is at goal and lifestyle modifications will likely benefit him further.   Hypertension: Blood pressures during this stay were well-controlled.   Mediterranean Diet Score is 5. Some suggestions include continue incorporating 2 or more servings per day of vegetables, fruits, and whole grains. Increase intake of fish and legumes/beans to 2 or more servings per week. Aim to increase intake of healthy fats, such as olive oil and avocados, and have a handful of nuts/seeds most days. Reduce red/processed meat consumption to 2 or fewer times per week.   Exercise: Recommended gradually increasing activity to 30-45 minutes most days of the week once cleared by referring cardiologist. Cardiac rehab might benefit this patient and is covered by major insurance plans (other than co-pays), please refer.   Medication Adherence: Patient has no issues with adherence at this time.   Smoking: This patient is a non-smoker.   Obesity/Overweight: The patient was advised about specific mechanisms such as reduced portions and increased activity for efforts toward weight loss.   Glucometabolic State: Patient's blood sugar is not well-controlled on the current regimen at this time. HbA1c today was 7.8%. Depending on discussions with patient's PCP and/or endocrinologist, we would recommend the possibility of switching from glipizide to a GLP-1 agonist or SGLT-2 inhibitor, as these newer agents have cardiovascular benefits as well as glucose control.   Sleep Apnea: The patient is at low risk for sleep apnea.   Psychological Stress: The patient appears to be coping with stressors well at this time.     Thank you for the opportunity to see this patient. Please feel free to contact Prevention if there are any questions, or if you feel that your patient would benefit from continued follow-up visits with the Program.    I am acting as a scribe on behalf of Dr. Marti Acosta,    Sheba Zazueta, [facility]  Cardiovascular Prevention     Marti Acosta MD  System Director, Cardiovascular Prevention

## 2019-10-16 VITALS
SYSTOLIC BLOOD PRESSURE: 144 MMHG | HEART RATE: 74 BPM | OXYGEN SATURATION: 98 % | DIASTOLIC BLOOD PRESSURE: 88 MMHG | RESPIRATION RATE: 16 BRPM

## 2019-10-16 LAB
ANION GAP SERPL CALC-SCNC: 11 MMOL/L — SIGNIFICANT CHANGE UP (ref 5–17)
BUN SERPL-MCNC: 12 MG/DL — SIGNIFICANT CHANGE UP (ref 7–23)
CALCIUM SERPL-MCNC: 9.2 MG/DL — SIGNIFICANT CHANGE UP (ref 8.4–10.5)
CHLORIDE SERPL-SCNC: 104 MMOL/L — SIGNIFICANT CHANGE UP (ref 96–108)
CO2 SERPL-SCNC: 26 MMOL/L — SIGNIFICANT CHANGE UP (ref 22–31)
CREAT SERPL-MCNC: 1.08 MG/DL — SIGNIFICANT CHANGE UP (ref 0.5–1.3)
GLUCOSE SERPL-MCNC: 174 MG/DL — HIGH (ref 70–99)
HCT VFR BLD CALC: 34.6 % — LOW (ref 39–50)
HGB BLD-MCNC: 10 G/DL — LOW (ref 13–17)
MAGNESIUM SERPL-MCNC: 1.8 MG/DL — SIGNIFICANT CHANGE UP (ref 1.6–2.6)
MCHC RBC-ENTMCNC: 21.6 PG — LOW (ref 27–34)
MCHC RBC-ENTMCNC: 28.9 GM/DL — LOW (ref 32–36)
MCV RBC AUTO: 74.7 FL — LOW (ref 80–100)
NRBC # BLD: 0 /100 WBCS — SIGNIFICANT CHANGE UP (ref 0–0)
PLATELET # BLD AUTO: 298 K/UL — SIGNIFICANT CHANGE UP (ref 150–400)
POTASSIUM SERPL-MCNC: 4 MMOL/L — SIGNIFICANT CHANGE UP (ref 3.5–5.3)
POTASSIUM SERPL-SCNC: 4 MMOL/L — SIGNIFICANT CHANGE UP (ref 3.5–5.3)
RBC # BLD: 4.63 M/UL — SIGNIFICANT CHANGE UP (ref 4.2–5.8)
RBC # FLD: 16.4 % — HIGH (ref 10.3–14.5)
SODIUM SERPL-SCNC: 141 MMOL/L — SIGNIFICANT CHANGE UP (ref 135–145)
WBC # BLD: 6.72 K/UL — SIGNIFICANT CHANGE UP (ref 3.8–10.5)
WBC # FLD AUTO: 6.72 K/UL — SIGNIFICANT CHANGE UP (ref 3.8–10.5)

## 2019-10-16 PROCEDURE — 93010 ELECTROCARDIOGRAM REPORT: CPT

## 2019-10-16 PROCEDURE — 99239 HOSP IP/OBS DSCHRG MGMT >30: CPT

## 2019-10-16 PROCEDURE — 99232 SBSQ HOSP IP/OBS MODERATE 35: CPT | Mod: 25

## 2019-10-16 RX ORDER — PRASUGREL 5 MG/1
1 TABLET, FILM COATED ORAL
Qty: 30 | Refills: 11
Start: 2019-10-16 | End: 2020-10-09

## 2019-10-16 RX ORDER — MAGNESIUM OXIDE 400 MG ORAL TABLET 241.3 MG
800 TABLET ORAL ONCE
Refills: 0 | Status: COMPLETED | OUTPATIENT
Start: 2019-10-16 | End: 2019-10-16

## 2019-10-16 RX ORDER — CLOPIDOGREL BISULFATE 75 MG/1
1 TABLET, FILM COATED ORAL
Qty: 0 | Refills: 0 | DISCHARGE

## 2019-10-16 RX ORDER — TICAGRELOR 90 MG/1
1 TABLET ORAL
Qty: 60 | Refills: 11
Start: 2019-10-16 | End: 2020-10-09

## 2019-10-16 RX ORDER — ATORVASTATIN CALCIUM 80 MG/1
1 TABLET, FILM COATED ORAL
Qty: 0 | Refills: 0 | DISCHARGE

## 2019-10-16 RX ORDER — ATORVASTATIN CALCIUM 80 MG/1
1 TABLET, FILM COATED ORAL
Qty: 30 | Refills: 3
Start: 2019-10-16 | End: 2020-02-12

## 2019-10-16 RX ORDER — PRASUGREL 5 MG/1
60 TABLET, FILM COATED ORAL ONCE
Refills: 0 | Status: COMPLETED | OUTPATIENT
Start: 2019-10-16 | End: 2019-10-16

## 2019-10-16 RX ADMIN — Medication 81 MILLIGRAM(S): at 11:43

## 2019-10-16 RX ADMIN — TICAGRELOR 90 MILLIGRAM(S): 90 TABLET ORAL at 06:25

## 2019-10-16 RX ADMIN — Medication 2: at 06:51

## 2019-10-16 RX ADMIN — BISOPROLOL FUMARATE 10 MILLIGRAM(S): 10 TABLET, FILM COATED ORAL at 06:51

## 2019-10-16 RX ADMIN — MAGNESIUM OXIDE 400 MG ORAL TABLET 800 MILLIGRAM(S): 241.3 TABLET ORAL at 09:54

## 2019-10-16 RX ADMIN — PRASUGREL 60 MILLIGRAM(S): 5 TABLET, FILM COATED ORAL at 12:21

## 2019-10-16 RX ADMIN — INFLUENZA VIRUS VACCINE 0.5 MILLILITER(S): 15; 15; 15; 15 SUSPENSION INTRAMUSCULAR at 08:42

## 2019-10-16 RX ADMIN — PANTOPRAZOLE SODIUM 40 MILLIGRAM(S): 20 TABLET, DELAYED RELEASE ORAL at 06:25

## 2019-10-16 RX ADMIN — Medication 6: at 11:43

## 2019-10-16 NOTE — DISCHARGE NOTE PROVIDER - NSDCACTIVITY_GEN_ALL_CORE
Showering allowed/No heavy lifting/straining Walking - Indoors allowed/Stairs allowed/No heavy lifting/straining/Walking - Outdoors allowed/Showering allowed

## 2019-10-16 NOTE — DISCHARGE NOTE PROVIDER - NSDCCPCAREPLAN_GEN_ALL_CORE_FT
PRINCIPAL DISCHARGE DIAGNOSIS  Diagnosis: Coronary artery disease  Assessment and Plan of Treatment: •	You underwent a cardiac catheterization 10/15/19 and the blockage in your Left main artery (artery in your heart) was opened with stent placement.  NEVER MISS A DOSE OF ASPIRIN OR PLAVIX;; IF YOU DO, YOU ARE AT RISK OF YOUR STENT CLOSING AND HAVING A HEART ATTACK. DO NOT STOP THESE TWO MEDICATIONS UNLESS INSTRUCTED TO DO SO BY YOUR CARDIOLOGIST  •	Your procedure was done through your groin.   •	You do not need to keep this area covered and you may shower  •	Please avoid any heavy lifting  (no more than 3 to 5 lbs) or strenuous activity for five days  •	If you develop any swelling, bleeding, hardening of the skin (hematoma formation), acute pain, numbness/tingling  in your  leg please contact your doctor immediately or call our 24/7 line: [phone].         SECONDARY DISCHARGE DIAGNOSES  Diagnosis: Hyperlipidemia  Assessment and Plan of Treatment: Please take    Diagnosis: HTN (hypertension)  Assessment and Plan of Treatment: Please take PRINCIPAL DISCHARGE DIAGNOSIS  Diagnosis: Coronary artery disease  Assessment and Plan of Treatment: •	You underwent a cardiac catheterization 10/15/19 and the blockage in your Left main artery (artery in your heart) was opened with stent placement.  Please stop taking PLAVIX 75 mg daily and START taking Brilinta 90 mg orally TWICE A DAY. In addition please make sure you continue to take your Aspirin 81 mg daily.   The Aspirin and Brilinta will keep the blockage open in your leg and the stent open in your heart! Do not stop these two medication unless you are instructed by your cardiologist .  NEVER MISS A DOSE OF ASPIRIN OR BRILLINTA;; IF YOU DO, YOU ARE AT RISK OF YOUR STENT CLOSING AND HAVING A HEART ATTACK. DO NOT STOP THESE TWO MEDICATIONS UNLESS INSTRUCTED TO DO SO BY YOUR CARDIOLOGIST  •	Your procedure was done through your groin.   •	You do not need to keep this area covered and you may shower  •	Please avoid any heavy lifting  (no more than 3 to 5 lbs) or strenuous activity for five days  •	If you develop any swelling, bleeding, hardening of the skin (hematoma formation), acute pain, numbness/tingling  in your  leg please contact your doctor immediately or call our 24/7 line: [phone].         SECONDARY DISCHARGE DIAGNOSES  Diagnosis: Hyperlipidemia  Assessment and Plan of Treatment: Please take    Diagnosis: HTN (hypertension)  Assessment and Plan of Treatment: Please take PRINCIPAL DISCHARGE DIAGNOSIS  Diagnosis: Coronary artery disease  Assessment and Plan of Treatment: •	You underwent a cardiac catheterization 10/15/19 and the blockage in your Left main artery (artery in your heart) was opened with stent placement.  Please STOP taking PLAVIX 75 mg daily and START taking Brilinta 90 mg orally TWICE A DAY. In addition please make sure you continue to take your Aspirin 81 mg daily.   The Aspirin and Brilinta will keep the blockage open in your leg and the stent open in your heart! Do not stop these two medication unless you are instructed by your cardiologist Dr Webster.  NEVER MISS A DOSE OF ASPIRIN OR BRILLINTA;; IF YOU DO, YOU ARE AT RISK OF YOUR STENT CLOSING AND HAVING A HEART ATTACK. DO NOT STOP THESE TWO MEDICATIONS UNLESS INSTRUCTED TO DO SO BY YOUR CARDIOLOGIST  •	Your procedure was done through your groin.   •	You do not need to keep this area covered and you may shower  •	Please avoid any heavy lifting  (no more than 3 to 5 lbs) or strenuous activity for five days  •	If you develop any swelling, bleeding, hardening of the skin (hematoma formation), acute pain, numbness/tingling  in your  leg please contact your doctor immediately or call our 24/7 line: [phone].         SECONDARY DISCHARGE DIAGNOSES  Diagnosis: Type 2 diabetes mellitus  Assessment and Plan of Treatment: Continue taking your home diabetes medication.  DO NOT RESTART YOUR ALOGLIPTIN-METFORMIN UNTIL FRIDAY 10/18/19.    Diagnosis: Hyperlipidemia  Assessment and Plan of Treatment: Your Atorvastatin was increased to 40mg once daily at bedtime.  A new prescription was sent to [facility] pharmacy.    Diagnosis: HTN (hypertension)  Assessment and Plan of Treatment: Please continue taking your Bisoprolol and Losartan home doses

## 2019-10-16 NOTE — DISCHARGE NOTE PROVIDER - NSDCFUADDINST_GEN_ALL_CORE_FT
•	Your procedure was done through your groin.   •	You do not need to keep this area covered and you may shower  •	Please avoid any heavy lifting  (no more than 3 to 5 lbs) or strenuous activity for five days  •	If you develop any swelling, bleeding, hardening of the skin (hematoma formation), acute pain, numbness/tingling  in your leg please contact your doctor immediately or call our 24/7 line: [phone].

## 2019-10-16 NOTE — DISCHARGE NOTE NURSING/CASE MANAGEMENT/SOCIAL WORK - PATIENT PORTAL LINK FT
You can access the FollowMyHealth Patient Portal offered by [facility] by registering at the following website: [email or web address]. By joining [facility]’s FollowMyHealth portal, you will also be able to view your health information using other applications (apps) compatible with our system.

## 2019-10-16 NOTE — DISCHARGE NOTE PROVIDER - CARE PROVIDER_API CALL
Rahul Webster (MD)  Cardiovascular Disease; Internal Medicine  [address] [place] [address]  Phone: [phone]  Fax: [phone]  Follow Up Time:

## 2019-10-16 NOTE — PROGRESS NOTE ADULT - SUBJECTIVE AND OBJECTIVE BOX
INTERVENTIONAL CARDIOLOGY FOLLOW UP NOTE    -Patient seen and examined this am  -No events overnight  -No complaints this am    T(C): 35.9 (10-16-19 @ 09:51), Max: 36.8 (10-15-19 @ 21:54)  HR: 75 (10-16-19 @ 08:35) (75 - 853)  BP: 159/87 (10-16-19 @ 08:35) (138/82 - 180/107)  RR: 17 (10-16-19 @ 08:35) (16 - 18)  SpO2: 97% (10-16-19 @ 08:35) (96% - 99%)    VASCULAR ACCESS EXAM:  FEMORAL:  2+ right/left femoral pulse  2+ DP/PT pulses.  -Access site clean, non-tender, without ecchymosis or hematoma.      A/P  S/p PCI via femoral approach with no evidence of vascular complications post procedure.  -continue with current medications including dual antiplatelet therapy  -discharge instructions as per protocol  -outpatient follow up with primary cardiologist

## 2019-10-16 NOTE — DISCHARGE NOTE PROVIDER - HOSPITAL COURSE
57 y/o male, poor historian, PMHx of HTN, abnormal EKG w/RBBB, prolonged QTc, VSD s/p VSD surgery @ [facility] 2001,  CAD s/p cardiac catheterization @ [facility] on 9/20/19 revealing LMCA 80%, OM 1 50% (discharged with plan for outpt discussion and consultation for CABG vs PCI of LM with subsequent decision with Dr. Webster for staged PCI of LCMA), acute blood loss anemia 2/2 rectal bleed requiring blood transfusion s/p EGD/colonoscopy ~10/1/19 that was unremarkable (H/H 9.6/33.3 on recent labs 9/20/19) who continues to c/o non-radiating, LSCP described as dull/pressure-like and of 8/10 intensity with associated moderate/severe SOB/fatigue/diaphoresis upon ambulation of less than 1/2 city block & <1 flight of stairs, resolving with 4-5 minutes of rest, over past 6  months. Furthermore, pt endorses intermittent episodes of PND/SOB that wakes him up from sleep occurring once every other night over past 6 months. Ifeanyi palpitations, orthopnea, lightheadedness, LE edema, syncope, N/V, abdominal pain. In light pt's risk factors, current CCS Class III/VI Anginal symptoms, known physiologically significant lesion, pt is referred for staged PCI of LMCA 80%.         Pt. s/p cardiac cath 10/16/19: IVUS/NITA LM; groin angioseal; EF 55 % by prior cath. Plavix d/c; pt. loaded with brillinta and pt. to c/w ASA and Brilinta 90 mg BID. As per ; pt. can switch to ASA/Plavix after 12 month indefinitely.    Pt. seen and examined at bedside today am. Pt. comfortable, denies any CP, SOB, dizziness, palpitations, groin access site stable, no bleeding and hematoma noted; distal pulse intact.   VSS. Labs stable o/n except for Mg … supplemented. Home meds reviewed with Dr. Carlson and pt. to be d/c on ASA/Brilinta,..         Pt. stable to be d/c as per Dr. Carlson and to f/u with Dr. Webster in 1-2 week     Patient has been given appropriate discharge instructions including medication regimen, access site management and follow up. Prescriptions have been e-prescribed to patient's preferred pharmacy 59 y/o male, poor historian, PMHx of HTN, abnormal EKG w/RBBB, prolonged QTc, VSD s/p VSD surgery @ [facility] 2001,  CAD s/p cardiac catheterization @ [facility] on 9/20/19 revealing LMCA 80%, OM 1 50% (discharged with plan for outpt discussion and consultation for CABG vs PCI of LM with subsequent decision with Dr. Webster for staged PCI of LCMA), acute blood loss anemia 2/2 rectal bleed requiring blood transfusion s/p EGD/colonoscopy ~10/1/19 that was unremarkable (H/H 9.6/33.3 on recent labs 9/20/19) who continues to c/o non-radiating, LSCP described as dull/pressure-like and of 8/10 intensity with associated moderate/severe SOB/fatigue/diaphoresis upon ambulation of less than 1/2 city block & <1 flight of stairs, resolving with 4-5 minutes of rest, over past 6  months. Furthermore, pt endorses intermittent episodes of PND/SOB that wakes him up from sleep occurring once every other night over past 6 months. Ifeanyi palpitations, orthopnea, lightheadedness, LE edema, syncope, N/V, abdominal pain. In light pt's risk factors, current CCS Class III/VI Anginal symptoms, known physiologically significant lesion, pt is referred for staged PCI of LMCA 80%.         Pt. s/p cardiac cath 10/16/19: IVUS/NITA LM; groin angioseal; EF 55 % by prior cath. Plavix d/c; pt. loaded with brillinta and pt. to c/w ASA and Brilinta 90 mg BID. As per ; pt. can switch to ASA/Plavix after 12 month indefinitely.    Pt. seen and examined at bedside today am. Pt. comfortable, denies any CP, SOB, dizziness, palpitations, groin access site stable, no bleeding and hematoma noted; distal pulse intact.   VSS. Labs stable o/n except for Mg … supplemented. Home meds reviewed with Dr. Carlson and pt. to be d/c on ASA/Brilinta and Atorvastatin will be increased to 40mg qHS.          Pt. stable to be d/c as per Dr. Carlson and to f/u with Dr. Webster in 1-2 week     Patient has been given appropriate discharge instructions including medication regimen, access site management and follow up. Prescriptions have been e-prescribed to patient's preferred pharmacy 59 y/o male, poor historian, PMHx of HTN, abnormal EKG w/RBBB, prolonged QTc, VSD s/p VSD surgery @ [facility] 2001,  CAD s/p cardiac catheterization @ [facility] on 9/20/19 revealing LMCA 80%, OM 1 50% (discharged with plan for outpt discussion and consultation for CABG vs PCI of LM with subsequent decision with Dr. Webster for staged PCI of LCMA), acute blood loss anemia 2/2 rectal bleed requiring blood transfusion s/p EGD/colonoscopy ~10/1/19 that was unremarkable (H/H 9.6/33.3 on recent labs 9/20/19) who continues to c/o non-radiating, LSCP described as dull/pressure-like and of 8/10 intensity with associated moderate/severe SOB/fatigue/diaphoresis upon ambulation of less than 1/2 city block & <1 flight of stairs, resolving with 4-5 minutes of rest, over past 6  months. Furthermore, pt endorses intermittent episodes of PND/SOB that wakes him up from sleep occurring once every other night over past 6 months. Ifeanyi palpitations, orthopnea, lightheadedness, LE edema, syncope, N/V, abdominal pain. In light pt's risk factors, current CCS Class III/VI Anginal symptoms, known physiologically significant lesion, pt is referred for staged PCI of LMCA 80%.         Pt. s/p cardiac cath 10/16/19: IVUS/NITA LM; groin angioseal; EF 55 % by prior cath. Plavix d/c; pt. loaded with brillinta and pt. to c/w ASA and Brilinta 90 mg BID. As per ; pt. can switch to ASA/Plavix after 12 month.  Prescription was sent to Pharmacy and Brilinta non-formulary with no option for Pre-Auth available.  Plavix and Prasugrel covered.  This was discussed with Dr Pulliam and pt will be loaded with Prasugrel 60mg PO x 1 today and discharged on Prasugrel 10mg daily for 1 year with ASA 81mg daily.  After one year he cn switch to ASA and Plavix per Dr Pulliam.

## 2019-10-16 NOTE — DISCHARGE NOTE PROVIDER - NSDCFUADDAPPT_GEN_ALL_CORE_FT
•	Please follow up with Dr. Webster in 1-2 weeks. Please call his office and make an appointment to see him.

## 2019-10-22 DIAGNOSIS — Z79.84 LONG TERM (CURRENT) USE OF ORAL HYPOGLYCEMIC DRUGS: ICD-10-CM

## 2019-10-22 DIAGNOSIS — I10 ESSENTIAL (PRIMARY) HYPERTENSION: ICD-10-CM

## 2019-10-22 DIAGNOSIS — I25.10 ATHEROSCLEROTIC HEART DISEASE OF NATIVE CORONARY ARTERY WITHOUT ANGINA PECTORIS: ICD-10-CM

## 2019-10-22 DIAGNOSIS — E11.9 TYPE 2 DIABETES MELLITUS WITHOUT COMPLICATIONS: ICD-10-CM

## 2019-10-22 DIAGNOSIS — Z79.82 LONG TERM (CURRENT) USE OF ASPIRIN: ICD-10-CM

## 2019-10-22 DIAGNOSIS — E78.5 HYPERLIPIDEMIA, UNSPECIFIED: ICD-10-CM

## 2019-11-01 ENCOUNTER — OUTPATIENT (OUTPATIENT)
Dept: OUTPATIENT SERVICES | Facility: HOSPITAL | Age: 59
LOS: 1 days | End: 2019-11-01
Payer: MEDICAID

## 2019-11-01 DIAGNOSIS — Z87.74 PERSONAL HISTORY OF (CORRECTED) CONGENITAL MALFORMATIONS OF HEART AND CIRCULATORY SYSTEM: Chronic | ICD-10-CM

## 2019-11-01 PROCEDURE — G9001: CPT

## 2019-11-12 DIAGNOSIS — Z71.89 OTHER SPECIFIED COUNSELING: ICD-10-CM

## 2019-11-19 PROCEDURE — C1760: CPT

## 2019-11-19 PROCEDURE — C1725: CPT

## 2019-11-19 PROCEDURE — C1753: CPT

## 2019-11-19 PROCEDURE — 80061 LIPID PANEL: CPT

## 2019-11-19 PROCEDURE — 80053 COMPREHEN METABOLIC PANEL: CPT

## 2019-11-19 PROCEDURE — C1887: CPT

## 2019-11-19 PROCEDURE — C1874: CPT

## 2019-11-19 PROCEDURE — 86140 C-REACTIVE PROTEIN: CPT

## 2019-11-19 PROCEDURE — 36415 COLL VENOUS BLD VENIPUNCTURE: CPT

## 2019-11-19 PROCEDURE — 85027 COMPLETE CBC AUTOMATED: CPT

## 2019-11-19 PROCEDURE — 85025 COMPLETE CBC W/AUTO DIFF WBC: CPT

## 2019-11-19 PROCEDURE — 85610 PROTHROMBIN TIME: CPT

## 2019-11-19 PROCEDURE — 82550 ASSAY OF CK (CPK): CPT

## 2019-11-19 PROCEDURE — 83036 HEMOGLOBIN GLYCOSYLATED A1C: CPT

## 2019-11-19 PROCEDURE — 93005 ELECTROCARDIOGRAM TRACING: CPT

## 2019-11-19 PROCEDURE — 80048 BASIC METABOLIC PNL TOTAL CA: CPT

## 2019-11-19 PROCEDURE — 85730 THROMBOPLASTIN TIME PARTIAL: CPT

## 2019-11-19 PROCEDURE — 82962 GLUCOSE BLOOD TEST: CPT

## 2019-11-19 PROCEDURE — C1894: CPT

## 2019-11-19 PROCEDURE — 83735 ASSAY OF MAGNESIUM: CPT

## 2019-11-19 PROCEDURE — 90686 IIV4 VACC NO PRSV 0.5 ML IM: CPT

## 2019-11-19 PROCEDURE — C1769: CPT

## 2019-11-19 PROCEDURE — 82553 CREATINE MB FRACTION: CPT

## 2024-02-22 RX ORDER — BISOPROLOL FUMARATE 10 MG/1
1 TABLET, FILM COATED ORAL
Qty: 0 | Refills: 0 | DISCHARGE

## 2024-02-22 RX ORDER — ALOGLIPTIN AND METFORMIN HYDROCHLORIDE 12.5; 5 MG/1; MG/1
1 TABLET, FILM COATED ORAL
Qty: 0 | Refills: 0 | DISCHARGE

## 2024-02-22 RX ORDER — IRON BG/IRON PS CPLX/C/CA-THR 150MG-50MG
1 CAPSULE ORAL
Qty: 0 | Refills: 0 | DISCHARGE

## 2024-02-22 RX ORDER — ASPIRIN/CALCIUM CARB/MAGNESIUM 324 MG
1 TABLET ORAL
Qty: 0 | Refills: 0 | DISCHARGE

## 2024-02-22 RX ORDER — PANTOPRAZOLE SODIUM 20 MG/1
1 TABLET, DELAYED RELEASE ORAL
Qty: 0 | Refills: 0 | DISCHARGE

## 2024-02-22 RX ORDER — LOSARTAN POTASSIUM 100 MG/1
1 TABLET, FILM COATED ORAL
Qty: 0 | Refills: 0 | DISCHARGE

## 2024-07-10 NOTE — PATIENT PROFILE ADULT - NSPROIMPLANTSMEDDEV_GEN_A_NUR
Refill Encounter    PCP Visits: Recent Visits  Date Type Provider Dept   05/07/24 Office Visit Nora Brooks MD [facility]   03/13/24 Office Visit Nora Brooks MD [facility]   Showing recent visits within past 360 days and meeting all other requirements  Future Appointments  No visits were found meeting these conditions.  Showing future appointments within next 720 days and meeting all other requirements     Last 3 Blood Pressure:   BP Readings from Last 3 Encounters:   05/07/24 90/64   03/22/24 95/68   03/21/24 111/80     Preferred Pharmacy:   [id number] [address] [facility]  [address] [facility] [address]  Phone: [phone] Fax: [phone]    Requested RX:  Requested Prescriptions     Pending Prescriptions Disp Refills    buPROPion (WELLBUTRIN XL) 300 MG 24 hr tablet 90 tablet 0     Sig: Take 1 tablet (300 mg total) by mouth once daily.      RX Route: Normal     None/Vascular stents/Clips